# Patient Record
Sex: MALE | Race: WHITE | NOT HISPANIC OR LATINO | Employment: FULL TIME | URBAN - METROPOLITAN AREA
[De-identification: names, ages, dates, MRNs, and addresses within clinical notes are randomized per-mention and may not be internally consistent; named-entity substitution may affect disease eponyms.]

---

## 2024-11-23 ENCOUNTER — APPOINTMENT (OUTPATIENT)
Dept: GENERAL RADIOLOGY | Facility: HOSPITAL | Age: 34
End: 2024-11-23
Payer: COMMERCIAL

## 2024-11-23 ENCOUNTER — HOSPITAL ENCOUNTER (EMERGENCY)
Facility: HOSPITAL | Age: 34
Discharge: HOME | End: 2024-11-23
Attending: EMERGENCY MEDICINE
Payer: COMMERCIAL

## 2024-11-23 VITALS
DIASTOLIC BLOOD PRESSURE: 84 MMHG | WEIGHT: 175 LBS | HEART RATE: 78 BPM | TEMPERATURE: 97.5 F | OXYGEN SATURATION: 96 % | BODY MASS INDEX: 25.05 KG/M2 | RESPIRATION RATE: 16 BRPM | HEIGHT: 70 IN | SYSTOLIC BLOOD PRESSURE: 116 MMHG

## 2024-11-23 DIAGNOSIS — S43.005A SHOULDER DISLOCATION, LEFT, INITIAL ENCOUNTER: Primary | ICD-10-CM
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
## 2024-11-23 LAB — GLUCOSE BLDC GLUCOMTR-MCNC: 113 MG/DL (ref 70–130)

## 2024-11-23 PROCEDURE — 73020 X-RAY EXAM OF SHOULDER: CPT

## 2024-11-23 PROCEDURE — 82948 REAGENT STRIP/BLOOD GLUCOSE: CPT

## 2024-11-23 PROCEDURE — 99285 EMERGENCY DEPT VISIT HI MDM: CPT | Performed by: EMERGENCY MEDICINE

## 2024-11-23 PROCEDURE — 96361 HYDRATE IV INFUSION ADD-ON: CPT

## 2024-11-23 PROCEDURE — 96374 THER/PROPH/DIAG INJ IV PUSH: CPT

## 2024-11-23 PROCEDURE — 25810000003 SODIUM CHLORIDE 0.9 % SOLUTION: Performed by: EMERGENCY MEDICINE

## 2024-11-23 PROCEDURE — 25010000002 KETOROLAC TROMETHAMINE PER 15 MG: Performed by: EMERGENCY MEDICINE

## 2024-11-23 PROCEDURE — 73030 X-RAY EXAM OF SHOULDER: CPT

## 2024-11-23 PROCEDURE — 25010000002 MIDAZOLAM PER 1MG

## 2024-11-23 RX ORDER — HYDROCODONE BITARTRATE AND ACETAMINOPHEN 5; 325 MG/1; MG/1
1 TABLET ORAL EVERY 6 HOURS PRN
Qty: 30 TABLET | Refills: 0 | Status: SHIPPED | OUTPATIENT
Start: 2024-11-23

## 2024-11-23 RX ORDER — MIDAZOLAM HYDROCHLORIDE 2 MG/2ML
INJECTION, SOLUTION INTRAMUSCULAR; INTRAVENOUS
Status: COMPLETED
Start: 2024-11-23 | End: 2024-11-23

## 2024-11-23 RX ORDER — KETOROLAC TROMETHAMINE 30 MG/ML
30 INJECTION, SOLUTION INTRAMUSCULAR; INTRAVENOUS ONCE
Status: COMPLETED | OUTPATIENT
Start: 2024-11-23 | End: 2024-11-23

## 2024-11-23 RX ORDER — SODIUM CHLORIDE 9 MG/ML
INJECTION, SOLUTION INTRAVENOUS
Status: DISCONTINUED
Start: 2024-11-23 | End: 2024-11-23 | Stop reason: HOSPADM

## 2024-11-23 RX ORDER — MIDAZOLAM HYDROCHLORIDE 1 MG/ML
2 INJECTION, SOLUTION INTRAMUSCULAR; INTRAVENOUS ONCE
Status: COMPLETED | OUTPATIENT
Start: 2024-11-23 | End: 2024-11-23

## 2024-11-23 RX ADMIN — KETOROLAC TROMETHAMINE 30 MG: 30 INJECTION, SOLUTION INTRAMUSCULAR; INTRAVENOUS at 14:16

## 2024-11-23 RX ADMIN — METHOHEXITAL SODIUM 80 MG: 500 INJECTION, POWDER, LYOPHILIZED, FOR SOLUTION INTRAMUSCULAR; INTRAVENOUS; RECTAL at 14:58

## 2024-11-23 RX ADMIN — MIDAZOLAM HYDROCHLORIDE 2 MG: 1 INJECTION, SOLUTION INTRAMUSCULAR; INTRAVENOUS at 14:51

## 2024-11-23 RX ADMIN — SODIUM CHLORIDE 1000 ML: 9 INJECTION, SOLUTION INTRAVENOUS at 14:16

## 2024-11-23 NOTE — ED PROVIDER NOTES
Subjective   History of Present Illness    Chief complaint:  shoulder pain    Location: Left    Quality/Severity: Severe    Timing/Onset/Duration: Just prior to arrival    Modifying Factors: Hurts to move    Associated Symptoms: No loss of consciousness.  No neck or back pain.  No chest pain or shortness of breath.  No abdominal pain.  No numbness, tingling, or weakness.  Patient has deformity of the left shoulder no change in bladder or bowel function.    Narrative: This 34-year-old white male was skateboarding.  He was unhelmeted and had a skateboard accident and presents with left shoulder pain.    PCP:    Review of Systems   Constitutional:  Positive for diaphoresis.   HENT:  Negative for nosebleeds and rhinorrhea.    Respiratory:  Negative for shortness of breath.    Cardiovascular:  Negative for chest pain.   Gastrointestinal:  Negative for abdominal pain, nausea and vomiting.   Genitourinary:  Negative for difficulty urinating.   Musculoskeletal:  Negative for back pain and neck pain.         left shoulder pain and deformity   Skin:  Negative for wound.   Neurological:  Negative for weakness, numbness and headaches.   Psychiatric/Behavioral:  Negative for confusion.          No past medical history on file.    Not on File    No past surgical history on file.    No family history on file.             Objective   Physical Exam  Vitals (The temperature is 97.5 °F, pulse 68, respirations 16, BP 61/47, room air pulse ox 98%.,  Repeat blood pressure with appropriate adjustment of the cuff indicates that the blood pressure is 120/82.) and nursing note reviewed.   HENT:      Head: Normocephalic and atraumatic.      Right Ear: Tympanic membrane normal.      Left Ear: Tympanic membrane normal.      Nose: Nose normal. No rhinorrhea.      Mouth/Throat:      Mouth: Mucous membranes are moist.   Eyes:      Extraocular Movements: Extraocular movements intact.      Pupils: Pupils are equal, round, and reactive to light.    Neck:      Comments: There is no tenderness, deformity, or bony step-offs upon palpation of the cervical, thoracic, lumbar sacrococcygeal spine  Cardiovascular:      Rate and Rhythm: Normal rate and regular rhythm.      Pulses: Normal pulses.      Heart sounds: Normal heart sounds.   Pulmonary:      Effort: Pulmonary effort is normal.      Breath sounds: Normal breath sounds.   Abdominal:      General: Abdomen is flat. Bowel sounds are normal. There is no distension.      Palpations: Abdomen is soft. There is no mass.      Tenderness: There is no abdominal tenderness. There is no right CVA tenderness, left CVA tenderness, guarding or rebound.      Hernia: No hernia is present.   Musculoskeletal:         General: No swelling or tenderness. Normal range of motion.   Skin:     General: Skin is warm.      Capillary Refill: Capillary refill takes less than 2 seconds.      Comments: Diaphoretic   Neurological:      General: No focal deficit present.      Mental Status: He is alert and oriented to person, place, and time.      Cranial Nerves: No cranial nerve deficit.      Sensory: No sensory deficit.      Motor: No weakness.         FX Dislocation    Date/Time: 11/23/2024 3:14 PM    Performed by: Abel Lawson MD  Authorized by: Abel Lawson MD    Consent:     Consent obtained:  Verbal    Consent given by:  Patient    Risks discussed:  Nerve damage, pain and vascular damage    Alternatives discussed:  No treatment and delayed treatment  Universal protocol:     Procedure explained and questions answered to patient or proxy's satisfaction: yes      Relevant documents present and verified: yes      Imaging studies available: yes      Required blood products, implants, devices, and special equipment available: yes      Immediately prior to procedure, a time out was called: yes      Patient identity confirmed:  Verbally with patient, hospital-assigned identification number and provided demographic data  Injury:      Injury location:  Shoulder    Shoulder injury location:  L shoulder    Hill-Sachs deformity: no    Pre-procedure details:     Distal neurologic exam:  Normal    Distal perfusion: distal pulses strong      Range of motion: reduced    Sedation:     Sedation type:  Moderate sedation  Procedure details:     Manipulation performed: yes      Reduction successful: yes      X-ray confirmed reduction: yes      Immobilization:  Sling  Post-procedure details:     Distal neurologic exam:  Normal    Distal perfusion: distal pulses strong      Range of motion: normal      Procedure completion:  Tolerated well, no immediate complications  Comments:      Reduction left anterior shoulder dislocation was successful.             ED Course      15:03 EST, 11/23/24:  The patient was advised that he would need sedation for reduction of the left shoulder dislocation.  Risk versus benefits were discussed with him.  He has agreed to the procedure.  Patient was given 2 mg of Versed and 80 mg of Brevital.  Using gentle traction and external rotation the shoulder was reduced.  After reduction of the dislocation there was good range of motion of the shoulder.  The capillary refills less than 2 seconds.  The sensation is intact.  2+ radial pulse.  A sling was applied.  Postreduction x-ray has been ordered.    15:12 EST, 11/23/24:  The patient was reassessed.  He is awake and alert and oriented x 4 with no focal deficits noted.  His vital signs were reviewed and are stable abdominal exam: Soft, nontender, no masses, positive bowel sounds.  Neurological exam: Conscious alert oriented x 4 with no focal deficits noted.  Left upper extremity exam: Normal range of motion.  The capillary refills less than 2 seconds.  The sensation is intact.  There is 2+ radial pulse.    15:12 EST, 11/23/24:  The patient's diagnosis of left shoulder dislocation was discussed with him.  The patient should wear the sling.  The patient should ice the left shoulder for 20  minutes every 2-3 hours while awake for 2 to 3 days.  The patient should take Motrin as needed as directed for pain.  The patient can take hydrocodone as needed as directed for pain if the Motrin is not controlling his pain.  Patient should take Colace as needed as directed for constipation if he is taking the Norco for pain.  The patient should call [facility] Monday morning for follow-up appointment next week.  The patient should return to the emergency department if there is increasing pain, numbness, tingling, weakness, change in color or temperature of the left upper extremity, worse in any way at all.  All of the patient's question were answered he will be discharged in good condition.                                               Medical Decision Making      Final diagnoses:   Shoulder dislocation, left, initial encounter   Other skateboard accident, initial encounter       ED Disposition  ED Disposition       None            No follow-up provider specified.       Medication List      No changes were made to your prescriptions during this visit.       XR Shoulder 2+ View Right    (Results Pending)   XR Shoulder 2+ View Left    (Results Pending)     Labs Reviewed - No data to display  No results found.    .cendnote       Abel Lawson MD  11/23/24 1510       Abel Lawson MD  11/23/24 1512

## 2024-11-23 NOTE — DISCHARGE INSTRUCTIONS
Ice the left shoulder for 20 minutes every 2 -3 hours while awake for 2 to 3 days.  Take Motrin as needed as directed for pain.  Take hydrocodone as needed as directed for pain if the Tylenol does not control your pain.  Take Colace as needed as directed for constipation if you are taking the Norco for pain.  Call [facility] on Monday for a follow-up appointment next week.  Return to the Emergency Department if there is increased pain, numbness, tingling, weakness, change in color or temperature of the left upper extremity, worse anyway at all.

## 2024-11-23 NOTE — ED NOTES
Right arm where BP cuff was placed was bent and squeezed tightly. Once arm was straightened we got an accurate BP

## 2024-11-25 ENCOUNTER — TELEPHONE (OUTPATIENT)
Dept: ORTHOPEDIC SURGERY | Facility: CLINIC | Age: 34
End: 2024-11-25
Payer: COMMERCIAL

## 2024-11-25 NOTE — TELEPHONE ENCOUNTER
CALLED PT TO SEE IF HE WANTED TO MAKE A FU FROM BEING SEEN IN [facility] ED. PATIENT WOULD LIKE TO WAIT, BUT WOULD ALSO LIKE TO GO TO [facility] IF NEEDED.  GAVE HIM [facility] NUMBER AND SAID HE CAN CALL IF HE NEEDS TO.